# Patient Record
Sex: MALE | Race: WHITE | NOT HISPANIC OR LATINO | URBAN - METROPOLITAN AREA
[De-identification: names, ages, dates, MRNs, and addresses within clinical notes are randomized per-mention and may not be internally consistent; named-entity substitution may affect disease eponyms.]

---

## 2020-08-19 ENCOUNTER — HOSPITAL ENCOUNTER (EMERGENCY)
Facility: HOSPITAL | Age: 45
Discharge: HOME | End: 2020-08-19
Attending: EMERGENCY MEDICINE | Admitting: EMERGENCY MEDICINE

## 2020-08-19 VITALS
TEMPERATURE: 97.9 F | HEIGHT: 73 IN | SYSTOLIC BLOOD PRESSURE: 117 MMHG | OXYGEN SATURATION: 100 % | WEIGHT: 245 LBS | HEART RATE: 80 BPM | BODY MASS INDEX: 32.47 KG/M2 | RESPIRATION RATE: 18 BRPM | DIASTOLIC BLOOD PRESSURE: 57 MMHG

## 2020-08-19 DIAGNOSIS — T15.00XA CORNEAL FOREIGN BODY: ICD-10-CM

## 2020-08-19 DIAGNOSIS — S05.00XA CORNEAL ABRASION: Primary | ICD-10-CM

## 2020-08-19 PROCEDURE — 99283 EMERGENCY DEPT VISIT LOW MDM: CPT

## 2020-08-19 PROCEDURE — 99284 EMERGENCY DEPT VISIT MOD MDM: CPT | Performed by: EMERGENCY MEDICINE

## 2020-08-19 RX ORDER — CIPROFLOXACIN HYDROCHLORIDE 3.5 MG/ML
1 SOLUTION/ DROPS TOPICAL ONCE
Status: COMPLETED | OUTPATIENT
Start: 2020-08-19 | End: 2020-08-19

## 2020-08-19 RX ORDER — TETRACAINE HYDROCHLORIDE 5 MG/ML
2 SOLUTION OPHTHALMIC ONCE
Status: COMPLETED | OUTPATIENT
Start: 2020-08-19 | End: 2020-08-19

## 2020-08-19 RX ADMIN — FLUORESCEIN SODIUM 1 STRIP: 1 STRIP OPHTHALMIC at 06:25

## 2020-08-19 RX ADMIN — CIPROFLOXACIN HYDROCHLORIDE 1 DROP: 3 SOLUTION/ DROPS OPHTHALMIC at 07:00

## 2020-08-19 RX ADMIN — TETRACAINE HYDROCHLORIDE 2 DROP: 5 SOLUTION OPHTHALMIC at 06:25

## 2020-08-19 NOTE — DISCHARGE INSTRUCTIONS
Please call Dr Cierra Garvin at the number provided as soon as possible for evaluation within the next 24 hours, if you developed worsening pain, discharge or vision changes please return to the emergency department

## 2020-08-19 NOTE — ED PROVIDER NOTES
History  Chief Complaint   Patient presents with    Eye Problem     Patient states he was stung by a bee a few days ago and has been having increased itching and burning to the right eye  Bee did not sting his eye however     56-year-old male presents for evaluation of foreign body sensation left eye, states that he felt sensation for last few days associated with increased lacrimation, conjunctival erythema and itchy, works in construction and wears eye protection intermittently  He tried using Visine drops without any relief  This morning he had worsening eye discomfort and photophobia so came in for evaluation  Otherwise denies any blurry vision or double vision  States he had some clear crusting around his left eye this morning  Does not wear contact lenses  Does not see optometry or ophthalmology regularly  None       History reviewed  No pertinent past medical history  History reviewed  No pertinent surgical history  History reviewed  No pertinent family history  I have reviewed and agree with the history as documented  E-Cigarette/Vaping     E-Cigarette/Vaping Substances     Social History     Tobacco Use    Smoking status: Current Every Day Smoker    Smokeless tobacco: Never Used   Substance Use Topics    Alcohol use: Not Currently     Comment: Socially    Drug use: Not Currently       Review of Systems   Constitutional: Negative for appetite change, chills and fever  HENT: Negative for rhinorrhea and sore throat  Eyes: Positive for photophobia, discharge, redness and itching  Negative for visual disturbance  Respiratory: Negative for cough and shortness of breath  Cardiovascular: Negative for chest pain and palpitations  Gastrointestinal: Negative for abdominal pain and diarrhea  Genitourinary: Negative for dysuria, frequency and urgency  Skin: Negative for rash  Neurological: Negative for dizziness and weakness     All other systems reviewed and are negative  Physical Exam  Physical Exam  Vitals signs and nursing note reviewed  Constitutional:       Appearance: He is well-developed  HENT:      Head: Normocephalic and atraumatic  Right Ear: External ear normal       Left Ear: External ear normal    Eyes:      General: Lids are normal  Vision grossly intact  Gaze aligned appropriately  Left eye: Foreign body present  Conjunctiva/sclera: Conjunctivae normal       Pupils: Pupils are equal, round, and reactive to light  Comments: Pain significantly improved with tetracaine installation  Small foreign body noted with surrounding corneal defect with fluorescent dye uptake  Otherwise Percy sign negative, pupils equal round and react  No consensual photophobia    Note pain with eye movement, no periorbital erythema  Does have conjunctival injection with limbic sparing       Neck:      Musculoskeletal: Normal range of motion and neck supple  Vascular: No JVD  Trachea: No tracheal deviation  Cardiovascular:      Rate and Rhythm: Normal rate and regular rhythm  Heart sounds: Normal heart sounds  No murmur  No friction rub  No gallop  Pulmonary:      Effort: Pulmonary effort is normal  No respiratory distress  Breath sounds: No stridor  No wheezing or rales  Abdominal:      General: There is no distension  Palpations: Abdomen is soft  There is no mass  Tenderness: There is no abdominal tenderness  There is no guarding or rebound  Musculoskeletal: Normal range of motion  Skin:     General: Skin is warm and dry  Coloration: Skin is not pale  Findings: No erythema or rash  Neurological:      Mental Status: He is alert and oriented to person, place, and time  Cranial Nerves: No cranial nerve deficit           Vital Signs  ED Triage Vitals [08/19/20 0622]   Temperature Pulse Respirations Blood Pressure SpO2   97 9 °F (36 6 °C) 80 20 (!) 177/56 100 %      Temp Source Heart Rate Source Patient Position - Orthostatic VS BP Location FiO2 (%)   Tympanic Monitor Sitting Left arm --      Pain Score       --           Vitals:    08/19/20 0622 08/19/20 0630   BP: (!) 177/56 117/57   Pulse: 80    Patient Position - Orthostatic VS: Sitting Sitting         Visual Acuity  Visual Acuity      Most Recent Value   Visual acuity R eye is  Other [20/15]   Visual acuity Left eye is  20/40   Visual acuity in both eyes is  20/25          ED Medications  Medications   fluorescein sodium sterile ophthalmic strip 1 strip (1 strip Both Eyes Given 8/19/20 0625)   tetracaine 0 5 % ophthalmic solution 2 drop (2 drops Both Eyes Given 8/19/20 0625)   ciprofloxacin (CILOXAN) 0 3 % ophthalmic solution 1 drop (1 drop Left Eye Given 8/19/20 0700)       Diagnostic Studies  Results Reviewed     None                 No orders to display              Procedures  Procedures         ED Course  ED Course as of Aug 19 0715   Wed Aug 19, 2020   0704 Left message with Dr Emily Powell answering service to call the patient at home number with an appointment today  US AUDIT      Most Recent Value   Initial Alcohol Screen: US AUDIT-C    1  How often do you have a drink containing alcohol? 1 Filed at: 08/19/2020 0623   2  How many drinks containing alcohol do you have on a typical day you are drinking? 1 Filed at: 08/19/2020 0623   3a  Male UNDER 65: How often do you have five or more drinks on one occasion? 1 Filed at: 08/19/2020 0623   3b  FEMALE Any Age, or MALE 65+: How often do you have 4 or more drinks on one occassion? 0 Filed at: 08/19/2020 1106   Audit-C Score  3 Filed at: 08/19/2020 3903                  CECIL/DAST-10      Most Recent Value   How many times in the past year have you    Used an illegal drug or used a prescription medication for non-medical reasons?   Never Filed at: 08/19/2020 2452                                MDM  Number of Diagnoses or Management Options  Corneal abrasion:   Corneal foreign body: Diagnosis management comments: 69-year-old male with foreign body in the left eye, does not wear contact lenses, was unable to remove foreign body using blunt needle or with cotton-tipped applicator, will start on ciprofloxacin drops, will arrange ophthalmology evaluation this morning  Spoke to Dr Sreekanth Escobedo will call patient for evaluation today        Disposition  Final diagnoses:   Corneal abrasion   Corneal foreign body     Time reflects when diagnosis was documented in both MDM as applicable and the Disposition within this note     Time User Action Codes Description Comment    8/19/2020  6:53 AM Zara Collazo Add [S05 00XA] Corneal abrasion     8/19/2020  6:53 AM Zara Blase Add [T15 00XA] Corneal foreign body       ED Disposition     ED Disposition Condition Date/Time Comment    Discharge Stable Wed Aug 19, 2020  7:04 AM [facility] discharge to home/self care  Follow-up Information     Follow up With Specialties Details Why Contact Info Additional Sissy Billy [address] Emergency Department Emergency Medicine  If symptoms worsen [address] [id number] [phone]  ED, [address] [place] [address] Call  please call the office as soon as it opens at 9 am for an appointment today , Office opens at 9 am [address] [place] [address]  [phone]             There are no discharge medications for this patient  No discharge procedures on file      PDMP Review     None          ED Provider  Electronically Signed by           Magnolia Newman MD  08/19/20 9577       Magnolia Newman MD  08/19/20 4971